# Patient Record
Sex: MALE | Race: WHITE | NOT HISPANIC OR LATINO
[De-identification: names, ages, dates, MRNs, and addresses within clinical notes are randomized per-mention and may not be internally consistent; named-entity substitution may affect disease eponyms.]

---

## 2023-03-05 ENCOUNTER — NON-APPOINTMENT (OUTPATIENT)
Age: 73
End: 2023-03-05

## 2023-03-15 ENCOUNTER — APPOINTMENT (OUTPATIENT)
Dept: PHYSICAL MEDICINE AND REHAB | Facility: CLINIC | Age: 73
End: 2023-03-15
Payer: MEDICARE

## 2023-03-15 ENCOUNTER — NON-APPOINTMENT (OUTPATIENT)
Age: 73
End: 2023-03-15

## 2023-03-15 VITALS — HEART RATE: 68 BPM | OXYGEN SATURATION: 98 % | SYSTOLIC BLOOD PRESSURE: 178 MMHG | DIASTOLIC BLOOD PRESSURE: 81 MMHG

## 2023-03-15 PROBLEM — Z00.00 ENCOUNTER FOR PREVENTIVE HEALTH EXAMINATION: Status: ACTIVE | Noted: 2023-03-15

## 2023-03-15 PROCEDURE — 99204 OFFICE O/P NEW MOD 45 MIN: CPT

## 2023-03-15 RX ORDER — GABAPENTIN 300 MG/1
300 CAPSULE ORAL
Qty: 30 | Refills: 1 | Status: ACTIVE | COMMUNITY
Start: 2023-03-15 | End: 1900-01-01

## 2023-03-15 NOTE — DATA REVIEWED
[MRI] : MRI [FreeTextEntry1] : Report\par CT-LUMBAR SPINE NON CONTRAST\par History: R20.2 Upper and Lower Extremity Pins and Needles R20.0 Numbness\par Lower/Upper Extremity\par Technique: Axial images were obtained through the lumbar spine with multiplanar\par reformatted images then generated from the axial acquired data. This study was performed\par using automatic exposure control (radiation dose reduction software) to obtain a\par diagnostic image quality scan with patient dose as low as reasonably achievable. One or\par more of the following dose reduction techniques were used: Automated exposure control,\par Adjustment of the mA and/or kV according to patient size or Use of iterative\par reconstruction technique. The administered radiation dose was 14.76 mSv.\par Comparison examinations: No prior study is available for comparison.\par Findings:\par There is straightening of the lumbar lordosis. No spondylolisthesis. Vertebral body\par heights are maintained. There is 5 mm hyperdensity in the L1 vertebral body anteriorly\par probably benign such as compact bone island. There is multilevel disc space narrowing.\par There is vacuum phenomena at L4-L5 and L5-S1 levels. There is 7x 13 mm lucency in the L5\par right lamina, nonspecific but probably benign. Evaluation of spinal canal content is\par limited as on CT. There is suggestion of congenital spinal canal stenosis. Mild calcific\par atherosclerotic changes in the abdominal aorta and iliac arteries.\par L1-L2: There is disc bulge and bilateral facet arthropathy. There is mild spinal canal and\par mild foraminal stenosis.\par L2-L3: There is diffuse disc bulge, bilateral facet arthropathy and ligamentum flavum\par buckling resulting severe spinal canal stenosis. There is mild bilateral foraminal\par narrowing.\par L3-L4: There is diffuse disc bulge, bilateral facet arthropathy and ligamentum flavum\par buckling resulting moderate spinal canal stenosis. There is moderate to severe bilateral\par foraminal narrowing.\par L4-L5: There is disc bulge and superimposed right sided disc herniation impinging the\par descending right L5 nerve root. There is bilateral facet arthropathy. There is moderate to\par severe bilateral foraminal narrowing.\par L5-51: There is left paracentral/lateral recess disc herniation impinging the descending\par left 51 nerve root. There is bilateral facet arthropathy. There is moderate to severe\par bilateral foraminal narrowing.\par IMPRESSION:\par \par Straightening of lumbar lordosis.\par Multilevel degenerative disc and facet arthropathy superimposed to congenital spinal canal\par stenosis contributing to varying degree of spinal canal, lateral recess and foraminal\par narrowing. Severe spinal canal stenosis at L2-L3 and moderate spinal canal stenosis at\par L3-L4 level.\par L4-L5: Right lateral recess disc herniation impinging the descending right L5 nerve root.\par L5-S1: Left lateral recess disc herniation impinging the descending left S1 nerve root.\par ICD 10 -\par Other biomechanical lesion, M99.83\par Degeneration lumbar spine, M51.36\par Herniated disc lumbar spine, M51.26\par OD 00 [address].\par [address]\par Signed by: Aure Frances MD\par Signed Date: 2/18/2019 11:15 AM EST

## 2023-03-15 NOTE — PHYSICAL EXAM
[FreeTextEntry1] : General exam \par \par Constitutional: The patient appears well-developed, well-nourished, and in no apparent distress. Patient is well-groomed.  \par \par Skin: The skin is warm and dry, with normal turgor.  No rashes or lesions are noted.  \par \par Eyes: PERRL.  \par \par ENMT: Ears: Hearing is grossly within normal limits.  \par \par Neck: Supple: The neck is supple.  \par \par Respiratory: Inspection: Breathing unlabored.  \par \par Neurologic: Alert and oriented x 3. \par \par Psychiatric: Patient is cooperative and appropriate.  Mood and affect are normal.  Patient's insight is good, and memory and judgment are intact.\par \par LUMBAR EXAM\par \par APPEARANCE:\par Well-healed surgical scar over lumbar spine\par No gross deformity or malalignment\par No erythema, swelling or ecchymosis\par Decreased lumbar lordosis\par \par TENDERNESS:\par +trigger points over bilateral lumbar paraspinal muscles\par Absent over midline spinous processes\par Absent over left lumbar paraspinal muscles: erector spinae and quadratus lumborum\par Absent over right lumbar paraspinal muscles: erector spinae and quadratus lumborum\par \par \par ROM:\par Pain with A/PROM of the lumbar spine\par +pain with flexion, extension of the lumbar spine\par +hamstring tightness on the left\par +hamstring tightness on the right\par \par SPECIAL TESTS:\par +left straight leg raising test\par +right straight leg raising test\par FABERE left normal\par FABERE right normal\par FADIR left normal\par FADIR right normal\par \par SENSORY TESTING:\par Intact to light touch Left L1-S2\par Intact to light touch Right L1-S2\par \par MOTOR TESTING:\par Muscle tone of the left lower extremity is normal\par Muscle tone of the right lower extremity is normal\par \par Left hip flexion strength is 5/5\par Right hip flexion strength is 5/5\par \par Left quadriceps strength is 5/5\par Right quadriceps strength is 5/5\par \par Left hamstrings strength is 5/5\par Right hamstrings strength is 5/5\par \par Left EHL strength is 5/5\par Right EHL strength is 4/5\par \par Left ankle dorsiflexion strength is 5/5\par Right ankle dorsiflexion strength is 5/5\par \par Left ankle plantar flexion strength is 5/5\par Right ankle plantar flexion strength is 5/5\par \par REFLEXES:\par Patella (L4) left 2+\par Patella (L4) right TKR\par \par GAIT:\par +antalgic gait\par Balance normal with ambulation\par

## 2023-03-15 NOTE — HISTORY OF PRESENT ILLNESS
[FreeTextEntry1] : RAQUEL SUNG is an 72 year old M here with his wife and daughter for initial evaluation of back pain and tingling in the foot. He has history of lumbar spinal surgery - laminectomy at L4L5 in 1996 and also pacemaker implantation for heart block. He is on ASA. He also recently had right TKR and is doing PT for that. He had seen NSX Dr. Wright in 2019 and was told he does not need surgery at that time.\par \par Pain location: lower back and right leg\par Quality: shooting\par Radiation: along the right leg into the large toe and inner foot\par Severity: 6/10\par Onset: many years ago with recent exacerbation\par Associated symptoms: tingling in the right foot\par Numbness: denies\par Weakness: notes right foot toe weakness but denies any foot drop or falling\par Exacerbated by: moving\par Improved by: rest\par Bowel or bladder involvement: denies\par \par Denies bowel/bladder dysfunction, saddle anesthesia, fevers, chills, weight loss, night pain, or night sweats at this time.\par \par The pain interferes with function, ADLs and quality of life.\par Patient had tried Acetaminophen, NSAIDs, prescription pain medications, muscle relaxants without any lasting relief of pain.\par \par Patient had CT imaging studies to evaluate the pain in 2018

## 2023-03-15 NOTE — ASSESSMENT
[FreeTextEntry1] : Mr. RAQUEL SUNG is a 72 year M with pain in the lower back on the both sides with radiation down the leg to the right foot. He reports chronic long standing pain and is noting an acute on chronic exacerbation of this pain due to lumbar radiculopathy and likely lumbar postlaminectomy syndrome. There are no myelopathic signs on today's exam.\par \par Patient reassured and educated on the diagnosis and treatment options. Risks and benefits of treatment and of delaying treatment discussed with patient. Risks discussed include but not limited to: progression of symptoms, worsening pain and functional status, etc.\par \par CT lumbar spine reviewed from 2019 - severe spinal stenosis and lateral recess stenosis with L5 and S1 impingement\par \par Patient was prescribed Gabapentin 300mg PO nightly for neuropathic pain:. Monitor for sedation, dizziness and any signs of respiratory depression. Avoid taking together with opioid pain medicines and other drugs that depress the central nervous system function. Avoid sudden cessation after prolonged use, due to risk of seizures. Risks and benefits were explained and patient expressed understanding.\par \par Patient is being sent and I am requesting authorization for CT w and w/o contrast of lumbar spine to evaluate for nerve compression, stenosis, degenerative disease, soft tissue injury or other new or worsening etiology of pain. Patient had tried and failed NSAIDs and home exercise program for at least 6 weeks. During next visit CT report and/or imaging will be reviewed with patient.\par \par Sending patient for PT for lumbar radiculopathy to help relieve pain and improve function. Stretching, strengthening, ROM, home education and other appropriate interventions. Precautions include fall prevention.\par \par Follow up 4 weeks\par If no relief will plan for lumbar BRANNON\par \par I have personally spent a total of at least 45 minutes preparing, reviewing internal and external records, explaining, counseling, and coordinating care for this patient encounter.\par \par Patient was advised if the following symptoms develop: chills, fever, loss of bladder control, bowel incontinence or urinary retention, numbness/tingling or weakness is present in upper or lower extremities, to go to the nearest emergency room. This may be a new clinical condition not present at the time of the patient visit that may lead to paralysis and/or death. Patient advised if the above symptoms developed to also call the office immediately to inform us and to go to the nearest emergency room.\par \par This note was generated using Dragon medical dictation software. A reasonable effort had been made for proofreading its contents, but spelling mistakes or grammatical errors may still remain. If there are any questions or points of clarification needed please notify my office.\par

## 2023-03-15 NOTE — REVIEW OF SYSTEMS
[Negative] : Neurological [Chest Pain] : no chest pain [Shortness Of Breath] : no shortness of breath [Abdominal Pain] : no abdominal pain [Dysuria] : no dysuria [FreeTextEntry5] : pacemaker [FreeTextEntry9] : back pain, right TKR

## 2023-05-11 ENCOUNTER — APPOINTMENT (OUTPATIENT)
Dept: PHYSICAL MEDICINE AND REHAB | Facility: CLINIC | Age: 73
End: 2023-05-11
Payer: MEDICARE

## 2023-05-11 VITALS
SYSTOLIC BLOOD PRESSURE: 140 MMHG | HEART RATE: 74 BPM | OXYGEN SATURATION: 97 % | TEMPERATURE: 97.2 F | DIASTOLIC BLOOD PRESSURE: 76 MMHG

## 2023-05-11 DIAGNOSIS — R20.2 ANESTHESIA OF SKIN: ICD-10-CM

## 2023-05-11 DIAGNOSIS — M96.1 POSTLAMINECTOMY SYNDROME, NOT ELSEWHERE CLASSIFIED: ICD-10-CM

## 2023-05-11 DIAGNOSIS — M79.671 PAIN IN RIGHT FOOT: ICD-10-CM

## 2023-05-11 DIAGNOSIS — M54.16 SPINAL STENOSIS, LUMBAR REGION WITHOUT NEUROGENIC CLAUDICATION: ICD-10-CM

## 2023-05-11 DIAGNOSIS — M48.061 SPINAL STENOSIS, LUMBAR REGION WITHOUT NEUROGENIC CLAUDICATION: ICD-10-CM

## 2023-05-11 DIAGNOSIS — R20.0 ANESTHESIA OF SKIN: ICD-10-CM

## 2023-05-11 PROCEDURE — 99213 OFFICE O/P EST LOW 20 MIN: CPT

## 2023-05-11 NOTE — ASSESSMENT
[FreeTextEntry1] : Mr. RAQUEL SUNG is a 72 year M with pain in the lower back on the both sides with radiation down the leg to the right foot due to lumbar radiculopathy and likely lumbar postlaminectomy syndrome. This pain has significantly resolved with PT. He mainly report right foot tingling now.\par \par Patient reassured and educated on the diagnosis and treatment options. Risks and benefits of treatment and of delaying treatment discussed with patient. Risks discussed include but not limited to: progression of symptoms, worsening pain and functional status, etc.\par \par CT lumbar spine reviewed from 2019 and 2023 and both were compared - overall improvement\par Stop Gabapentin\par Continue PT to completion\par Referring to podiatry for right foot paraesthesias\par Follow up PRN\par \par I have personally spent a total of at least 25 minutes preparing, reviewing internal and external records, explaining, counseling, and coordinating care for this patient encounter.\par \par Patient was advised if the following symptoms develop: chills, fever, loss of bladder control, bowel incontinence or urinary retention, numbness/tingling or weakness is present in upper or lower extremities, to go to the nearest emergency room. This may be a new clinical condition not present at the time of the patient visit that may lead to paralysis and/or death. Patient advised if the above symptoms developed to also call the office immediately to inform us and to go to the nearest emergency room.\par \par This note was generated using Dragon medical dictation software. A reasonable effort had been made for proofreading its contents, but spelling mistakes or grammatical errors may still remain. If there are any questions or points of clarification needed please notify my office.

## 2023-05-11 NOTE — REVIEW OF SYSTEMS
[Negative] : Constitutional [Chest Pain] : no chest pain [Shortness Of Breath] : no shortness of breath [Abdominal Pain] : no abdominal pain [Dysuria] : no dysuria [FreeTextEntry9] : right foot tingling

## 2023-05-11 NOTE — HISTORY OF PRESENT ILLNESS
[FreeTextEntry1] : RAQUEL SUNG is here for follow up for his back pains. He has been doing PT with good results of the back pains. He no longer feels any pain in the lower back. RAQUEL SUNG went for CT lumbar spine at [facility]. He is mainly reporting right bottom of foot tingling. He tried to double up on the Gabapentin at night without relief.\par \par Denies any new pain, numbness or weakness, bowel/bladder dysfunction, saddle anesthesia, fevers, chills, weight loss, night pain, or night sweats at this time.\par \par From initial eval on 3/15/23;\par RAQUEL SUNG is an 72 year old M here with his wife and daughter for initial evaluation of back pain and tingling in the foot. He has history of lumbar spinal surgery - laminectomy at L4L5 in 1996 and also pacemaker implantation for heart block. He is on ASA. He also recently had right TKR and is doing PT for that. He had seen NSX Dr. Wright in 2019 and was told he does not need surgery at that time.\par \par Pain location: lower back and right leg\par Quality: shooting\par Radiation: along the right leg into the large toe and inner foot\par Severity: 6/10\par Onset: many years ago with recent exacerbation\par Associated symptoms: tingling in the right foot\par Numbness: denies\par Weakness: notes right foot toe weakness but denies any foot drop or falling\par Exacerbated by: moving\par Improved by: rest\par Bowel or bladder involvement: denies\par \par Denies bowel/bladder dysfunction, saddle anesthesia, fevers, chills, weight loss, night pain, or night sweats at this time.\par \par The pain interferes with function, ADLs and quality of life.\par Patient had tried Acetaminophen, NSAIDs, prescription pain medications, muscle relaxants without any lasting relief of pain.\par \par Patient had CT imaging studies to evaluate the pain in 2018

## 2023-05-11 NOTE — DATA REVIEWED
[CT Scan] : CT Scan [FreeTextEntry1] : CT lumbar spine in [facility] - improvement of findings since 2019

## 2023-05-11 NOTE — PHYSICAL EXAM
[FreeTextEntry1] : General exam \par \par Constitutional: The patient appears well-developed, well-nourished, and in no apparent distress. Patient is well-groomed.  \par \par Skin: The skin is warm and dry, with normal turgor.  No rashes or lesions are noted.  \par \par Eyes: PERRL.  \par \par ENMT: Ears: Hearing is grossly within normal limits.  \par \par Neck: Supple: The neck is supple.  \par \par Respiratory: Inspection: Breathing unlabored.  \par \par Neurologic: Alert and oriented x 3. \par \par Psychiatric: Patient is cooperative and appropriate.  Mood and affect are normal.  Patient's insight is good, and memory and judgment are intact.\par